# Patient Record
Sex: FEMALE | Race: WHITE | NOT HISPANIC OR LATINO | URBAN - METROPOLITAN AREA
[De-identification: names, ages, dates, MRNs, and addresses within clinical notes are randomized per-mention and may not be internally consistent; named-entity substitution may affect disease eponyms.]

---

## 2019-04-10 ENCOUNTER — APPOINTMENT (RX ONLY)
Dept: URBAN - METROPOLITAN AREA CLINIC 23 | Facility: CLINIC | Age: 24
Setting detail: DERMATOLOGY
End: 2019-04-10

## 2019-04-10 DIAGNOSIS — L90.5 SCAR CONDITIONS AND FIBROSIS OF SKIN: ICD-10-CM

## 2019-04-10 PROBLEM — D23.39 OTHER BENIGN NEOPLASM OF SKIN OF OTHER PARTS OF FACE: Status: ACTIVE | Noted: 2019-04-10

## 2019-04-10 PROBLEM — F41.9 ANXIETY DISORDER, UNSPECIFIED: Status: ACTIVE | Noted: 2019-04-10

## 2019-04-10 PROCEDURE — ? OTHER

## 2019-04-10 PROCEDURE — 99201: CPT

## 2019-04-10 ASSESSMENT — LOCATION ZONE DERM: LOCATION ZONE: NOSE

## 2019-04-10 ASSESSMENT — LOCATION SIMPLE DESCRIPTION DERM: LOCATION SIMPLE: NOSE

## 2019-04-10 ASSESSMENT — LOCATION DETAILED DESCRIPTION DERM: LOCATION DETAILED: NASAL TIP

## 2019-04-10 NOTE — PROCEDURE: OTHER
Note Text (......Xxx Chief Complaint.): This diagnosis correlates with the
Other (Free Text): With Dermatoscopy no signs of BCC. She is to let us know if it changes.
Detail Level: Simple
Other (Free Text): Nevus vs cyst.

## 2019-04-10 NOTE — HPI: EVALUATION OF SKIN LESION(S)
What Type Of Note Output Would You Prefer (Optional)?: Standard Output
Hpi Title: Evaluation of a Skin Lesion
How Severe Are Your Spot(S)?: mild
Have Your Spot(S) Been Treated In The Past?: has not been treated
Additional History: Tried rubbing alcohol and warm compresses

## 2023-09-04 NOTE — HPI: SECONDARY COMPLAINT
September 4, 2023    To Whom It May Concern:         This is confirmation that SNEHA HO attended her scheduled appointment with Maggie Rodriguez P.A.-C. on 9/04/23. Please excuse work absences through 9/7/2023 for medical reasons. Sneha can return to work without restrictions on 9/8/2023 or earlier as long as symptoms have improved/resolved and she is without fever for 24 hours.         If you have any questions please do not hesitate to call me at the phone number listed below.    Sincerely,    Maggie Rodriguez P.A.-C.  [phone]                  
    September 4, 2023    To Whom It May Concern:         This is confirmation that SNEHA HO attended her scheduled appointment with Maggie Rodriguez P.A.-C. on 9/04/23. Sneha tested POSITIVE for COVID-19 in clinic today. Please use work absences per CDC guidelines.         If you have any questions please do not hesitate to call me at the phone number listed below.    Sincerely,    Maggie Rodriguez P.A.-C.  [phone]                  
How Severe Is This Condition?: mild